# Patient Record
(demographics unavailable — no encounter records)

---

## 2024-10-31 NOTE — PHYSICAL EXAM
[de-identified] : General Appearance / Station: Well developed, well nourished, in no acute distress Orientation: Oriented to person, place, and time Gait & Station: Ambulates without assistive device Neurologic: Normal leg sensation Cardiovascular: Warm extremity Lymphatics: No lymphedema Generalized Ligament Laxity: Normal Stiffness: Normal.  LUMBAR SPINE Nontender at lumbar spine Straight leg raise: Negative Motor: 5/5 motor L2-S1 Sensation Intact. No paresthesias L2-S1  SYMPTOMATIC LEFT HIP Range of motion: PAINFUL internal and external rotation of the hip. Strength: Within Normal Limits. Negative Trendelenburg sign                                                                      FADIR: POSITIVE Stinchfield: POSITIVE, with groin pain Palpation: TENDER at greater trochanter, Nontender SI joint                    LEFT KNEE Alignment: Varus Skin: Normal.  Effusion: None Quadriceps: Normal Range of motion: Normal PF crepitus: 1+ PF apprehension: None Patella / Patella Tendon: None Palpation: Nontender Meniscus signs: Negative  ASYMPTOMATIC RIGHT HIP Range of motion: Painless internal and external rotation of the hip. Skin: Normal Strength: Within normal limits DANILO: Negative FADIR: Negative Stinchfield: Negative Palpation: Nontender at greater trochanter, Nontender at SI joint [de-identified] : Imaging: AP Pelvis and lateral views of the left hip show left hip no significant joint space narrowing t. There are no signs of fracture. The soft tissues appear unremarkable

## 2024-10-31 NOTE — HISTORY OF PRESENT ILLNESS
[de-identified] : Patient presents for evaluation of left hip pain status started in the beginning of October and symptoms worsened to the point where she went to the ER for evaluation of her hip as well as intra-abdominal pathology.  CT scan was obtained which was negative.  She has intermittent groin pain as well as lateral hip pain on both sides of the hip.

## 2024-10-31 NOTE — DISCUSSION/SUMMARY
[de-identified] : NOLBERTO HERNÁNDEZ  is an 49 year- female who presents to the clinic with left hip pain radiating into the groin as well as bilateral trochanteric bursitis.  The symptoms began [] after a traumatic injury. Radiographic findings show no obvious fracture or deformity, however patient has had continued pain and pain with weightbearing.  Given the traumatic nature of the injury, I am concerned about a possible [labral tear.  I explained that x-rays show the cortical bone well but would not show a nondisplaced fracture or bony edema as well as an MRI nor which show soft tissue injuries.  At this point, the patient is quite debilitated by their pain and is unable to return to their activities of daily living such as walking without a limp. Given the persistent symptoms, I discussed with the patient that they should avoid strenuous activities and use a cane as needed while we obtain an MRI to further evaluate the hip. The office will work to obtain the MRI.   We discussed that should they develop worsening pain, numbness, weakness, inability to bear weight, they should present to the emergency department as soon as possible. They know to reach out to our team with any questions or concerns.   I discussed with them that often prescribe an anti-inflammatory that should be taken once a day with meals. They should not take this while also taking Aleve (Naprosyn), Motrin/ Advil (Ibuprofen), Toradol (ketoralac). They must stop taking it if they develops stomach pain, increased bleeding or bruising and they should follow-up with their primary care doctor for routine blood work including kidney function to monitor its effect. While it Is not a habit-forming substance, it should only be taken as needed and to discontinue use once symptoms have resolved.  She will instead try a topical anti-inflammatory cream